# Patient Record
Sex: FEMALE | Race: WHITE | NOT HISPANIC OR LATINO
[De-identification: names, ages, dates, MRNs, and addresses within clinical notes are randomized per-mention and may not be internally consistent; named-entity substitution may affect disease eponyms.]

---

## 2018-12-24 ENCOUNTER — TRANSCRIPTION ENCOUNTER (OUTPATIENT)
Age: 68
End: 2018-12-24

## 2019-03-02 ENCOUNTER — TRANSCRIPTION ENCOUNTER (OUTPATIENT)
Age: 69
End: 2019-03-02

## 2019-11-01 ENCOUNTER — APPOINTMENT (OUTPATIENT)
Dept: RADIOLOGY | Facility: CLINIC | Age: 69
End: 2019-11-01
Payer: MEDICARE

## 2019-11-01 ENCOUNTER — APPOINTMENT (OUTPATIENT)
Dept: ULTRASOUND IMAGING | Facility: CLINIC | Age: 69
End: 2019-11-01

## 2019-11-01 PROCEDURE — 93970 EXTREMITY STUDY: CPT

## 2019-11-01 PROCEDURE — 73560 X-RAY EXAM OF KNEE 1 OR 2: CPT | Mod: RT

## 2021-02-14 ENCOUNTER — EMERGENCY (EMERGENCY)
Facility: HOSPITAL | Age: 71
LOS: 1 days | End: 2021-02-14
Admitting: EMERGENCY MEDICINE
Payer: MEDICARE

## 2021-02-14 PROCEDURE — 73110 X-RAY EXAM OF WRIST: CPT | Mod: 26,LT,77

## 2021-02-14 PROCEDURE — 73130 X-RAY EXAM OF HAND: CPT | Mod: 26,LT

## 2021-02-14 PROCEDURE — 73110 X-RAY EXAM OF WRIST: CPT | Mod: 26,LT

## 2021-02-14 PROCEDURE — 99284 EMERGENCY DEPT VISIT MOD MDM: CPT

## 2021-04-08 ENCOUNTER — OUTPATIENT (OUTPATIENT)
Dept: OUTPATIENT SERVICES | Facility: HOSPITAL | Age: 71
LOS: 1 days | Discharge: HOME | End: 2021-04-08

## 2022-09-02 ENCOUNTER — APPOINTMENT (OUTPATIENT)
Dept: ORTHOPEDIC SURGERY | Facility: CLINIC | Age: 72
End: 2022-09-02

## 2022-09-02 VITALS — HEIGHT: 68 IN | WEIGHT: 177 LBS | BODY MASS INDEX: 26.83 KG/M2

## 2022-09-02 DIAGNOSIS — Z87.42 PERSONAL HISTORY OF OTHER DISEASES OF THE FEMALE GENITAL TRACT: ICD-10-CM

## 2022-09-02 DIAGNOSIS — Z78.9 OTHER SPECIFIED HEALTH STATUS: ICD-10-CM

## 2022-09-02 DIAGNOSIS — M65.30 TRIGGER FINGER, UNSPECIFIED FINGER: ICD-10-CM

## 2022-09-02 PROBLEM — Z00.00 ENCOUNTER FOR PREVENTIVE HEALTH EXAMINATION: Status: ACTIVE | Noted: 2022-09-02

## 2022-09-02 PROCEDURE — 20550 NJX 1 TENDON SHEATH/LIGAMENT: CPT | Mod: F7

## 2022-09-02 PROCEDURE — 99214 OFFICE O/P EST MOD 30 MIN: CPT | Mod: 25

## 2022-09-02 NOTE — ASSESSMENT
[FreeTextEntry1] : Right middle and left ring trigger - Discussed with patient the pathoanatomy of trigger and options going forward. Risks/benefits discussed as well as natural progression that injection will provide some relief but symptoms may recur. Discussed that pain may worsen in coming days but will subside. Discussed that we can repeat an injection or that operative intervention may be indicated down the line.\par \par Procedure 1 - 0.5cc 1%lidocaine +0.5cc 40mg/cc Kenalog administered to right middle finger A1 pulley following sterilization with Betadine. Patient tolerated this well.\par \par F/u 4mo (Jessica)

## 2022-09-02 NOTE — HISTORY OF PRESENT ILLNESS
[de-identified] : 71F, RHD, No PMHx presents with left hand ring finger trigger finger and right hand middle finger trigger finger. She\par reports since fracturing the left wrist in February 2021, she has started to get a painful tingling feeling radiating from\par her wrist up to her shoulder. Patient reports her fingers started approx. 3 months ago. Reports constant sticking,\par clicking and locking. Denies numbness/tingling.\par \par 9/2/22: f/u left hand middle finger . Reports having a lot of pain, but also finger is starting to "change shape" admits to pain. Denies numbness/tingling. Denies bracing. Denies OT. -

## 2022-09-02 NOTE — IMAGING
[de-identified] : Right hand with no swelling or erythema. Able to make fist. +locking of middle finger with flexion contracture. +TTP at middle finger A1 pulley. Sensation intact throughout.

## 2023-06-15 ENCOUNTER — APPOINTMENT (OUTPATIENT)
Dept: ORTHOPEDIC SURGERY | Facility: CLINIC | Age: 73
End: 2023-06-15
Payer: MEDICARE

## 2023-06-15 DIAGNOSIS — M23.201 DERANGEMENT OF UNSPECIFIED LATERAL MENISCUS DUE TO OLD TEAR OR INJURY, LEFT KNEE: ICD-10-CM

## 2023-06-15 DIAGNOSIS — E78.00 PURE HYPERCHOLESTEROLEMIA, UNSPECIFIED: ICD-10-CM

## 2023-06-15 DIAGNOSIS — M11.262 OTHER CHONDROCALCINOSIS, LEFT KNEE: ICD-10-CM

## 2023-06-15 DIAGNOSIS — Z78.9 OTHER SPECIFIED HEALTH STATUS: ICD-10-CM

## 2023-06-15 DIAGNOSIS — M17.12 UNILATERAL PRIMARY OSTEOARTHRITIS, LEFT KNEE: ICD-10-CM

## 2023-06-15 DIAGNOSIS — S83.242A OTHER TEAR OF MEDIAL MENISCUS, CURRENT INJURY, LEFT KNEE, INITIAL ENCOUNTER: ICD-10-CM

## 2023-06-15 PROCEDURE — 73562 X-RAY EXAM OF KNEE 3: CPT | Mod: LT

## 2023-06-15 PROCEDURE — 99213 OFFICE O/P EST LOW 20 MIN: CPT

## 2023-06-15 RX ORDER — NAPROXEN 500 MG/1
500 TABLET ORAL
Qty: 28 | Refills: 0 | Status: ACTIVE | COMMUNITY
Start: 2023-06-15 | End: 1900-01-01

## 2023-06-15 NOTE — DISCUSSION/SUMMARY
[de-identified] : Discussed options including NSAIDS< medrol, CSI, PT, repeat MRI, lubricant injections, surgery. \par \par Patient was given a prescription for Naproxen 500mg, they were instructed to take this twice daily for two weeks.\par \par patient will begin PT at this jules , given Rx\par \par f/u 6 weeks\par Entered by Yahir Garcia acting as scribe.\par Dr. Oliva Attestation\par The documentation recorded by the scribe, in my presence, accurately reflects the service I, Dr. Oliva, personally performed, and the decisions made by me with my edits as appropriate.

## 2023-06-15 NOTE — PHYSICAL EXAM
[Left] : left knee [de-identified] : Constitutional: The patient appears well developed, well nourished. Examination of patients ability to communicate functionally was normal. \par \par Neurologic: Coordination is normal. Alert and oriented to time, place and person. No evidence of mood disorder, calm affect. \par \par LEFT      KNEE  : Inspection of the knee is as follows: Mild to moderate effusion. no ecchymosis, no streaking, no erythema, no atrophy, no deformities of the quad tendon and no deformities of patellar tendon. \par \par Palpation of the knee is as follows: medial joint line tenderness, lateral joint line tenderness, medial facet of patella tenderness, lateral facet of patella tenderness and crepitus. no palpable masses and no increased warmth. \par \par Knee Range of Motion is as follows in degrees:  \par \par Extension: 0 \par Flexion: 115  \par \par Strength examination of the knee is as follows: \par Quadriceps strength is 5/5 \par Hamstring strength is 5/5 \par \par Ligament Stability and Special Test ligamentously stable, negative anterior draw, negative Lachman test, negative posterior draw and no varus or valgus instability. \par negative McMurrays test and able to do active straight leg raise without an extensor lag. \par \par Neurological examination of the knee is as follows: light touch is intact throughout. \par \par Gait and function is as follows: mildly antalgic gait. \par   [FreeTextEntry9] : ap/lat/skiers show med/lateral joint space narrowing mild to mod. med/lateral compartment chondrocalcinosis.

## 2024-08-07 ENCOUNTER — APPOINTMENT (OUTPATIENT)
Dept: ORTHOPEDIC SURGERY | Facility: CLINIC | Age: 74
End: 2024-08-07

## 2024-08-07 PROBLEM — I10 HYPERTENSION: Status: RESOLVED | Noted: 2024-08-07 | Resolved: 2024-08-07

## 2024-08-07 PROCEDURE — 20550 NJX 1 TENDON SHEATH/LIGAMENT: CPT | Mod: RT

## 2024-08-07 PROCEDURE — 99212 OFFICE O/P EST SF 10 MIN: CPT | Mod: 25

## 2024-08-07 NOTE — HISTORY OF PRESENT ILLNESS
[de-identified] : 71F, RHD, No PMHx presents with left hand ring finger trigger finger and right hand middle finger trigger finger. She reports since fracturing the left wrist in February 2021, she has started to get a painful tingling feeling radiating from her wrist up to her shoulder. Patient reports her fingers started approx. 3 months ago. Reports constant sticking, clicking and locking. Denies numbness/tingling.  9/2/22: f/u left hand middle finger . Reports having a lot of pain, but also finger is starting to "change shape" admits to pain. Denies numbness/tingling. Denies bracing. Denies OT. -  ***New Complaint***  8/7/24: Patient presents for pain in MAN[place] hands, specifically her fingers. Patient states she has been having pain for about 2 months with NKI. Patient has had injections about 2 years ago. Patient states she is having pain moving her fingers and writing. Patient is having pain mostly in her RT thumb and LT ring finger. Patient is having clicking in her fingers. Patient denies NSAIDs and numbness/tingling.

## 2024-08-07 NOTE — ASSESSMENT
[FreeTextEntry1] : Right thumb and middle finger trigger - Discussed with patient the pathoanatomy of trigger and options going forward. Risks/benefits discussed as well as natural progression that injection will provide some relief but symptoms may recur. Discussed that pain may worsen in coming days but will subside. Discussed that we can repeat an injection or that operative intervention may be indicated down the line.  Procedure 1 - 0.5cc 1%lidocaine +0.5cc 40mg/cc Kenalog administered to right thumb A1 pulley following sterilization with Betadine. Patient tolerated this well. Procedure 2 - 0.5cc 1%lidocaine +0.5cc 40mg/cc Kenalog administered to left middle finger A1 pulley following sterilization with Betadine. Patient tolerated this well.  F/u 4mo (Jessica)

## 2024-08-07 NOTE — IMAGING
[de-identified] : Right hand with no swelling or erythema. Able to make fist. +ttp at thumb A1 pulley. Sensation intact throughout.   Left hand with no swelling or erythema. Able to make fist. +ttp at middle finger A1 pulley. Sensation intact throughout.

## 2025-06-11 ENCOUNTER — APPOINTMENT (OUTPATIENT)
Dept: ORTHOPEDIC SURGERY | Facility: CLINIC | Age: 75
End: 2025-06-11
Payer: MEDICARE

## 2025-06-11 VITALS — HEIGHT: 68 IN | BODY MASS INDEX: 26.98 KG/M2 | WEIGHT: 178 LBS

## 2025-06-11 PROCEDURE — 99213 OFFICE O/P EST LOW 20 MIN: CPT

## 2025-07-23 ENCOUNTER — APPOINTMENT (OUTPATIENT)
Dept: ORTHOPEDIC SURGERY | Facility: CLINIC | Age: 75
End: 2025-07-23
Payer: MEDICARE

## 2025-07-23 DIAGNOSIS — M65.30 TRIGGER FINGER, UNSPECIFIED FINGER: ICD-10-CM

## 2025-07-23 PROCEDURE — 99213 OFFICE O/P EST LOW 20 MIN: CPT
